# Patient Record
Sex: FEMALE | URBAN - METROPOLITAN AREA
[De-identification: names, ages, dates, MRNs, and addresses within clinical notes are randomized per-mention and may not be internally consistent; named-entity substitution may affect disease eponyms.]

---

## 2022-11-01 ENCOUNTER — APPOINTMENT (RX ONLY)
Dept: URBAN - METROPOLITAN AREA CLINIC 55 | Facility: CLINIC | Age: 47
Setting detail: DERMATOLOGY
End: 2022-11-01

## 2022-11-01 DIAGNOSIS — Z41.9 ENCOUNTER FOR PROCEDURE FOR PURPOSES OTHER THAN REMEDYING HEALTH STATE, UNSPECIFIED: ICD-10-CM

## 2022-11-01 PROCEDURE — ? INVENTORY

## 2022-11-01 PROCEDURE — ? COSMETIC CONSULTATION: BOTULINUM TOXIN

## 2022-11-01 PROCEDURE — ? BOTOX

## 2024-07-05 ENCOUNTER — APPOINTMENT (RX ONLY)
Dept: URBAN - METROPOLITAN AREA CLINIC 55 | Facility: CLINIC | Age: 49
Setting detail: DERMATOLOGY
End: 2024-07-05

## 2024-07-05 DIAGNOSIS — Z41.9 ENCOUNTER FOR PROCEDURE FOR PURPOSES OTHER THAN REMEDYING HEALTH STATE, UNSPECIFIED: ICD-10-CM

## 2024-07-05 PROCEDURE — ? BOTOX

## 2024-07-05 NOTE — PROCEDURE: BOTOX
Depressor Anguli Oris Units: 0
Incrementing Botox Units: By 0.5 Units
Show Topical Anesthesia: Yes
Show Mentalis Units: No
Detail Level: Detailed
Comments: Make up was removed from treatment area and then prepped with 70% isopropyl alcohol prior to injections.
Glabellar Complex Units: 20
Show Inventory Tab: Show
Consent obtained and risk reviewed.
Additional Area 1 Location: upper lip
Post-Care Instructions: Discussed not to lie down flat  or vigorously rub the treatment area for the next 4 hours .
Dilution (U/0.1 Cc): 4